# Patient Record
Sex: MALE | Race: WHITE | NOT HISPANIC OR LATINO | Employment: FULL TIME | URBAN - METROPOLITAN AREA
[De-identification: names, ages, dates, MRNs, and addresses within clinical notes are randomized per-mention and may not be internally consistent; named-entity substitution may affect disease eponyms.]

---

## 2017-08-22 ENCOUNTER — HOSPITAL ENCOUNTER (EMERGENCY)
Facility: MEDICAL CENTER | Age: 22
End: 2017-08-22
Attending: EMERGENCY MEDICINE
Payer: COMMERCIAL

## 2017-08-22 VITALS
BODY MASS INDEX: 35.79 KG/M2 | HEIGHT: 70 IN | DIASTOLIC BLOOD PRESSURE: 106 MMHG | HEART RATE: 74 BPM | OXYGEN SATURATION: 98 % | RESPIRATION RATE: 14 BRPM | TEMPERATURE: 97.5 F | WEIGHT: 250 LBS | SYSTOLIC BLOOD PRESSURE: 166 MMHG

## 2017-08-22 DIAGNOSIS — T50.901A DRUG OVERDOSE, ACCIDENTAL OR UNINTENTIONAL, INITIAL ENCOUNTER: ICD-10-CM

## 2017-08-22 LAB
ALBUMIN SERPL BCP-MCNC: 4.7 G/DL (ref 3.2–4.9)
ALBUMIN/GLOB SERPL: 1.6 G/DL
ALP SERPL-CCNC: 58 U/L (ref 30–99)
ALT SERPL-CCNC: 19 U/L (ref 2–50)
AMPHET UR QL SCN: NEGATIVE
ANION GAP SERPL CALC-SCNC: 20 MMOL/L (ref 0–11.9)
ANION GAP SERPL CALC-SCNC: 9 MMOL/L (ref 0–11.9)
AST SERPL-CCNC: 22 U/L (ref 12–45)
BARBITURATES UR QL SCN: NEGATIVE
BASOPHILS # BLD AUTO: 0.7 % (ref 0–1.8)
BASOPHILS # BLD: 0.12 K/UL (ref 0–0.12)
BENZODIAZ UR QL SCN: POSITIVE
BILIRUB SERPL-MCNC: 0.6 MG/DL (ref 0.1–1.5)
BUN SERPL-MCNC: 10 MG/DL (ref 8–22)
BUN SERPL-MCNC: 12 MG/DL (ref 8–22)
BZE UR QL SCN: NEGATIVE
CALCIUM SERPL-MCNC: 8.8 MG/DL (ref 8.5–10.5)
CALCIUM SERPL-MCNC: 9.9 MG/DL (ref 8.5–10.5)
CANNABINOIDS UR QL SCN: POSITIVE
CHLORIDE SERPL-SCNC: 102 MMOL/L (ref 96–112)
CHLORIDE SERPL-SCNC: 107 MMOL/L (ref 96–112)
CO2 SERPL-SCNC: 15 MMOL/L (ref 20–33)
CO2 SERPL-SCNC: 21 MMOL/L (ref 20–33)
CREAT SERPL-MCNC: 0.88 MG/DL (ref 0.5–1.4)
CREAT SERPL-MCNC: 1.26 MG/DL (ref 0.5–1.4)
EOSINOPHIL # BLD AUTO: 0.03 K/UL (ref 0–0.51)
EOSINOPHIL NFR BLD: 0.2 % (ref 0–6.9)
ERYTHROCYTE [DISTWIDTH] IN BLOOD BY AUTOMATED COUNT: 39.3 FL (ref 35.9–50)
ETHANOL BLD-MCNC: 0 G/DL
GFR SERPL CREATININE-BSD FRML MDRD: >60 ML/MIN/1.73 M 2
GFR SERPL CREATININE-BSD FRML MDRD: >60 ML/MIN/1.73 M 2
GLOBULIN SER CALC-MCNC: 2.9 G/DL (ref 1.9–3.5)
GLUCOSE BLD-MCNC: 163 MG/DL (ref 65–99)
GLUCOSE SERPL-MCNC: 216 MG/DL (ref 65–99)
GLUCOSE SERPL-MCNC: 93 MG/DL (ref 65–99)
HCT VFR BLD AUTO: 52.1 % (ref 42–52)
HGB BLD-MCNC: 17.8 G/DL (ref 14–18)
IMM GRANULOCYTES # BLD AUTO: 0.23 K/UL (ref 0–0.11)
IMM GRANULOCYTES NFR BLD AUTO: 1.3 % (ref 0–0.9)
LYMPHOCYTES # BLD AUTO: 2.13 K/UL (ref 1–4.8)
LYMPHOCYTES NFR BLD: 11.6 % (ref 22–41)
MCH RBC QN AUTO: 29.2 PG (ref 27–33)
MCHC RBC AUTO-ENTMCNC: 34.2 G/DL (ref 33.7–35.3)
MCV RBC AUTO: 85.4 FL (ref 81.4–97.8)
METHADONE UR QL SCN: NEGATIVE
MONOCYTES # BLD AUTO: 1.12 K/UL (ref 0–0.85)
MONOCYTES NFR BLD AUTO: 6.1 % (ref 0–13.4)
NEUTROPHILS # BLD AUTO: 14.76 K/UL (ref 1.82–7.42)
NEUTROPHILS NFR BLD: 80.1 % (ref 44–72)
NRBC # BLD AUTO: 0 K/UL
NRBC BLD AUTO-RTO: 0 /100 WBC
OPIATES UR QL SCN: NEGATIVE
OXYCODONE UR QL SCN: NEGATIVE
PCP UR QL SCN: NEGATIVE
PLATELET # BLD AUTO: 281 K/UL (ref 164–446)
PMV BLD AUTO: 11.2 FL (ref 9–12.9)
POC BREATHALIZER: 0 PERCENT (ref 0–0.01)
POTASSIUM SERPL-SCNC: 3.7 MMOL/L (ref 3.6–5.5)
POTASSIUM SERPL-SCNC: 4.6 MMOL/L (ref 3.6–5.5)
PROPOXYPH UR QL SCN: NEGATIVE
PROT SERPL-MCNC: 7.6 G/DL (ref 6–8.2)
RBC # BLD AUTO: 6.1 M/UL (ref 4.7–6.1)
SODIUM SERPL-SCNC: 137 MMOL/L (ref 135–145)
SODIUM SERPL-SCNC: 137 MMOL/L (ref 135–145)
WBC # BLD AUTO: 18.4 K/UL (ref 4.8–10.8)

## 2017-08-22 PROCEDURE — 80048 BASIC METABOLIC PNL TOTAL CA: CPT

## 2017-08-22 PROCEDURE — 85025 COMPLETE CBC W/AUTO DIFF WBC: CPT

## 2017-08-22 PROCEDURE — 96360 HYDRATION IV INFUSION INIT: CPT

## 2017-08-22 PROCEDURE — 82962 GLUCOSE BLOOD TEST: CPT

## 2017-08-22 PROCEDURE — 700105 HCHG RX REV CODE 258: Performed by: EMERGENCY MEDICINE

## 2017-08-22 PROCEDURE — 302970 POC BREATHALIZER: Performed by: EMERGENCY MEDICINE

## 2017-08-22 PROCEDURE — 80053 COMPREHEN METABOLIC PANEL: CPT

## 2017-08-22 PROCEDURE — 36415 COLL VENOUS BLD VENIPUNCTURE: CPT

## 2017-08-22 PROCEDURE — 80307 DRUG TEST PRSMV CHEM ANLYZR: CPT

## 2017-08-22 PROCEDURE — 99285 EMERGENCY DEPT VISIT HI MDM: CPT

## 2017-08-22 RX ORDER — SODIUM CHLORIDE 9 MG/ML
1000 INJECTION, SOLUTION INTRAVENOUS ONCE
Status: COMPLETED | OUTPATIENT
Start: 2017-08-22 | End: 2017-08-22

## 2017-08-22 RX ADMIN — SODIUM CHLORIDE 1000 ML: 9 INJECTION, SOLUTION INTRAVENOUS at 05:15

## 2017-08-22 RX ADMIN — SODIUM CHLORIDE 1000 ML: 9 INJECTION, SOLUTION INTRAVENOUS at 05:00

## 2017-08-22 ASSESSMENT — ENCOUNTER SYMPTOMS
ABDOMINAL PAIN: 0
HEADACHES: 0
FEVER: 0
SHORTNESS OF BREATH: 0

## 2017-08-22 ASSESSMENT — PAIN SCALES - GENERAL
PAINLEVEL_OUTOF10: 0
PAINLEVEL_OUTOF10: 0

## 2017-08-22 ASSESSMENT — LIFESTYLE VARIABLES: SUBSTANCE_ABUSE: 1

## 2017-08-22 NOTE — ED NOTES
"Pt brought in by EMS due to LSD and mushroom use. Pt was found outside stating he was having a bad trip. Pt yelling and shouting \"take me to [facility]\" and thinks [facility] is in the room. Pt hallucinating, agitated, uncooperative and diaphoretic. Pt placed in two violent restraints at 0411, right arm and left leg. Pt also placed in soft restraints at 0418, right arm and left leg. IV established, blood drawn and sent to lab. NS infusing, pt tachycardic 140s. Pupils PEERLA, 5mm. AAOxself  "

## 2017-08-22 NOTE — ED AVS SNAPSHOT
Home Care Instructions                                                                                                                Ranjan Ferris   MRN: [id number]    Department:  [facility], Emergency Dept   Date of Visit:  8/22/2017            [facility], Emergency Dept    [address] [place] [id number]    Phone:  [phone]      You were seen by     Gracie Sood D.O.      Your Diagnosis Was     Drug overdose, accidental or unintentional, initial encounter     T50.901A LSD. marijuana      These are the medications you received during your hospitalization from 08/22/2017 0418 to 08/22/2017 0855     Date/Time Order Dose Route Action    08/22/2017 0500 NS infusion 1,000 mL 1,000 mL Intravenous New Bag    08/22/2017 0515 NS infusion 1,000 mL 1,000 mL Intravenous New Bag      Follow-up Information     1. Follow up with Your Physician In 2 days.    Specialty:  Emergency Medicine    Contact information    Varies          2. Follow up with [facility], Emergency Dept.    Specialty:  Emergency Medicine    Why:  If symptoms worsen    Contact information    [address] [place] [id number] [phone]      Medication Information     Review all of your home medications and newly ordered medications with your primary doctor and/or pharmacist as soon as possible. Follow medication instructions as directed by your doctor and/or pharmacist.     Please keep your complete medication list with you and share with your physician. Update the information when medications are discontinued, doses are changed, or new medications (including over-the-counter products) are added; and carry medication information at all times in the event of emergency situations.               Medication List      Notice     You have not been prescribed any medications.            Procedures and tests performed during your visit     Procedure/Test Number of Times Performed       ACCU-CHECK 1    ACCU-CHEK GLUCOSE 1    BASIC METABOLIC PANEL 1    CARDIAC MONITORING 1    CBC WITH DIFFERENTIAL 1    COMP METABOLIC PANEL 1    DIAGNOSTIC ALCOHOL 1    ESTIMATED GFR 2    NURSING COMMUNICATION 1    O2 Protocol 1    POC BREATHALIZER 1    SALINE LOCK 1    URINE DRUG SCREEN (TRIAGE) 1        Discharge Instructions       Drug Overdose  Drug overdose happens when you take too much of a drug. An overdose can occur with illegal drugs, prescription drugs, or over-the-counter (OTC) drugs.  The effects of drug overdose can be mild, dangerous, or even deadly.  CAUSES  Drug overdose may be caused by:  · Taking too much of a drug on purpose.  · Taking too much of a drug by accident.  · An error made by a health care provider who prescribes a drug.  · An error made by a pharmacist who fills the prescription order.  Drugs that commonly cause overdose include:  · Mental health drugs.  · Pain medicines.  · Illegal drugs.  · OTC cough and cold medicines.  · Heart medicines.  · Seizure medicines.  RISK FACTORS  Drug overdose is more likely in:  · Children. They may be attracted to colorful pills. Because of children's small size, even a small amount of a drug can be dangerous.  · Elderly people. They may be taking many different drugs. Elderly people may have difficulty reading labels or remembering when they last took their medicine.  The risk of drug overdose is also higher for someone who:  · Takes illegal drugs.  · Takes a drug and drinks alcohol.  · Has a mental health condition.  SYMPTOMS  Signs and symptoms of drug overdose depend on the drug and the amount that was taken. Common danger signs include:  · Behavior changes.  · Sleepiness.  · Slowed breathing.  · Nausea and vomiting.  · Seizures.  · Changes in eye pupil size (very large or very small).  If there are signs of very low blood pressure from a drug overdose (shock), emergency treatment is required. These signs include:  · Cold and clammy skin.  · Pale  skin.  · Blue lips.  · Very slow breathing.  · Extreme sleepiness.  · Loss of consciousness.  DIAGNOSIS  Drug overdose may be diagnosed based on your symptoms. It is important that you tell your health care provider:  · All of the drugs that you have taken.  · When you took the drugs.  · Whether you were drinking alcohol.  Your health care provider will do a physical exam. This exam may include:  · Checking and monitoring your heart rate and rhythm, your temperature, and your blood pressure (vital signs).  · Checking your breathing and oxygen level.  You may also have tests, including:   · Urine tests to check for drugs in your system.  · Blood tests to check for:  ¨ Drugs in your system.  ¨ Signs of an imbalance of your blood minerals (electrolytes).  ¨ Liver damage.  ¨ Kidney damage.  TREATMENT  Supporting your vital signs and your breathing is the first step in treating a drug overdose. Treatment may also include:  · Receiving fluids and electrolytes through an IV tube.  · Having a breathing tube (endotracheal tube) inserted in your airway to help you breathe.  · Having a tube passed through your nose and into your stomach (nasogastric tube) to wash out your stomach.  · Medicines. You may get medicines to:  ¨ Make you vomit.  ¨ Absorb any medicine that is left in your digestive system (activated charcoal).  ¨ Block or reverse the effect of the drug that caused the overdose.  · Having your blood filtered through an artificial kidney machine (hemodialysis). You may need this if your overdose is severe or if you have kidney failure.  · Having ongoing counseling and mental health support if you intentionally overdosed or used an illegal drug.  HOME CARE INSTRUCTIONS  · Take medicines only as directed by your health care provider. Always ask your health care provider to discuss the possible side effects of any new drug that you start taking.  · Keep a list of all of the drugs that you take, including over-the-counter  medicines. Bring this list with you to all of your medical visits.  · Read the drug inserts that come with your medicines.  · Do not use illegal drugs.  · Do not drink alcohol when taking drugs.  · Store all medicines in safety containers that are out of the reach of children.  · Keep the phone number of your local poison control center near your phone or on your cell phone.  · Get help if you are struggling with alcohol or drug use.  · Get help if you are struggling with depression or another mental health problem.  · Keep all follow-up visits as directed by your health care provider. This is important.  SEEK MEDICAL CARE IF:  · Your symptoms return.  · You develop any new signs or symptoms when you are taking medicines.  SEEK IMMEDIATE MEDICAL CARE IF:  · You think that you or someone else may have taken too much of a drug. The hotline of the [place] [facility] is (418) 217-5364.  · You or someone else is having symptoms of a drug overdose.  · You have serious thoughts about hurting yourself or others.  · You have chest pain.  · You have difficulty breathing.  · You have a loss of consciousness.  Drug overdose is an emergency. Do not wait to see if the symptoms will go away. Get medical help right away. Call your local emergency services (911 in the [place] Do not drive yourself to the hospital.     This information is not intended to replace advice given to you by your health care provider. Make sure you discuss any questions you have with your health care provider.     Document Released: 05/03/2016 Document Reviewed: 12/23/2015  [employer] Interactive Patient Education ©2016 [employer]            Patient Information     Patient Information    Following emergency treatment: all patient requiring follow-up care must return either to a private physician or a clinic if your condition worsens before you are able to obtain further medical attention, please return to the emergency room.     Billing  Information    At [facility], we work to make the billing process streamlined for our patients.  Our Representatives are here to answer any questions you may have regarding your hospital bill.  If you have insurance coverage and have supplied your insurance information to us, we will submit a claim to your insurer on your behalf.  Should you have any questions regarding your bill, we can be reached online or by phone as follows:  Online: You are able pay your bills online or live chat with our representatives about any billing questions you may have. We are here to help Monday - Friday from 8:00am to 7:30pm and 9:00am - 12:00pm on Saturdays.  Please visit [email or web address]  for more information.   Phone:  [phone] or [phone]    Please note that your emergency physician, surgeon, pathologist, radiologist, anesthesiologist, and other specialists are not employed by [facility] and will therefore bill separately for their services.  Please contact them directly for any questions concerning their bills at the numbers below:     Emergency Physician Services:  [phone]  [facility]:  [phone]  [facility]:  [phone]  [facility]:  [phone]    1. Your final bill may vary from the amount quoted upon discharge if all procedures are not complete at that time, or if your doctor has additional procedures of which we are not aware. You will receive an additional bill if you return to the Emergency Department at [facility] for suture removal regardless of the facility of which the sutures were placed.     2. Please arrange for settlement of this account at the emergency registration.    3. All self-pay accounts are due in full at the time of treatment.  If you are unable to meet this obligation then payment is expected within 4-5 days.     4. If you have had radiology studies (CT, X-ray, Ultrasound, MRI), you have  received a preliminary result during your emergency department visit. Please contact the radiology department [phone] to receive a copy of your final result. Please discuss the Final result with your primary physician or with the follow up physician provided.     Crisis Hotline:  [place] Crisis Hotline:  9-053-QWVEJEL or 1-112.183.6006  [place] Crisis Hotline:    1-998.553.6018 or 154-148-7927         ED Discharge Follow Up Questions    1. In order to provide you with very good care, we would like to follow up with a phone call in the next few days.  May we have your permission to contact you?     YES /  NO    2. What is the best phone number to call you? (       )_____-__________    3. What is the best time to call you?      Morning  /  Afternoon  /  Evening                   Patient Signature:  ____________________________________________________________    Date:  ____________________________________________________________

## 2017-08-22 NOTE — DISCHARGE INSTRUCTIONS
Drug Overdose  Drug overdose happens when you take too much of a drug. An overdose can occur with illegal drugs, prescription drugs, or over-the-counter (OTC) drugs.  The effects of drug overdose can be mild, dangerous, or even deadly.  CAUSES  Drug overdose may be caused by:  · Taking too much of a drug on purpose.  · Taking too much of a drug by accident.  · An error made by a health care provider who prescribes a drug.  · An error made by a pharmacist who fills the prescription order.  Drugs that commonly cause overdose include:  · Mental health drugs.  · Pain medicines.  · Illegal drugs.  · OTC cough and cold medicines.  · Heart medicines.  · Seizure medicines.  RISK FACTORS  Drug overdose is more likely in:  · Children. They may be attracted to colorful pills. Because of children's small size, even a small amount of a drug can be dangerous.  · Elderly people. They may be taking many different drugs. Elderly people may have difficulty reading labels or remembering when they last took their medicine.  The risk of drug overdose is also higher for someone who:  · Takes illegal drugs.  · Takes a drug and drinks alcohol.  · Has a mental health condition.  SYMPTOMS  Signs and symptoms of drug overdose depend on the drug and the amount that was taken. Common danger signs include:  · Behavior changes.  · Sleepiness.  · Slowed breathing.  · Nausea and vomiting.  · Seizures.  · Changes in eye pupil size (very large or very small).  If there are signs of very low blood pressure from a drug overdose (shock), emergency treatment is required. These signs include:  · Cold and clammy skin.  · Pale skin.  · Blue lips.  · Very slow breathing.  · Extreme sleepiness.  · Loss of consciousness.  DIAGNOSIS  Drug overdose may be diagnosed based on your symptoms. It is important that you tell your health care provider:  · All of the drugs that you have taken.  · When you took the drugs.  · Whether you were drinking alcohol.  Your health  care provider will do a physical exam. This exam may include:  · Checking and monitoring your heart rate and rhythm, your temperature, and your blood pressure (vital signs).  · Checking your breathing and oxygen level.  You may also have tests, including:   · Urine tests to check for drugs in your system.  · Blood tests to check for:  ¨ Drugs in your system.  ¨ Signs of an imbalance of your blood minerals (electrolytes).  ¨ Liver damage.  ¨ Kidney damage.  TREATMENT  Supporting your vital signs and your breathing is the first step in treating a drug overdose. Treatment may also include:  · Receiving fluids and electrolytes through an IV tube.  · Having a breathing tube (endotracheal tube) inserted in your airway to help you breathe.  · Having a tube passed through your nose and into your stomach (nasogastric tube) to wash out your stomach.  · Medicines. You may get medicines to:  ¨ Make you vomit.  ¨ Absorb any medicine that is left in your digestive system (activated charcoal).  ¨ Block or reverse the effect of the drug that caused the overdose.  · Having your blood filtered through an artificial kidney machine (hemodialysis). You may need this if your overdose is severe or if you have kidney failure.  · Having ongoing counseling and mental health support if you intentionally overdosed or used an illegal drug.  HOME CARE INSTRUCTIONS  · Take medicines only as directed by your health care provider. Always ask your health care provider to discuss the possible side effects of any new drug that you start taking.  · Keep a list of all of the drugs that you take, including over-the-counter medicines. Bring this list with you to all of your medical visits.  · Read the drug inserts that come with your medicines.  · Do not use illegal drugs.  · Do not drink alcohol when taking drugs.  · Store all medicines in safety containers that are out of the reach of children.  · Keep the phone number of your local poison control  center near your phone or on your cell phone.  · Get help if you are struggling with alcohol or drug use.  · Get help if you are struggling with depression or another mental health problem.  · Keep all follow-up visits as directed by your health care provider. This is important.  SEEK MEDICAL CARE IF:  · Your symptoms return.  · You develop any new signs or symptoms when you are taking medicines.  SEEK IMMEDIATE MEDICAL CARE IF:  · You think that you or someone else may have taken too much of a drug. The hotline of the [facility] Control [facility] is (087) 432-0510.  · You or someone else is having symptoms of a drug overdose.  · You have serious thoughts about hurting yourself or others.  · You have chest pain.  · You have difficulty breathing.  · You have a loss of consciousness.  Drug overdose is an emergency. Do not wait to see if the symptoms will go away. Get medical help right away. Call your local emergency services (911 in the [place] Do not drive yourself to the hospital.     This information is not intended to replace advice given to you by your health care provider. Make sure you discuss any questions you have with your health care provider.     Document Released: 05/03/2016 Document Reviewed: 12/23/2015  [employer] Interactive Patient Education ©2016 [employer]
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
## 2017-08-22 NOTE — ED AVS SNAPSHOT
8/22/2017    Ranjan Ferris  [address]  [place] [address]    Dear Ranjan:    [facility] wants to ensure your discharge home is safe and you or your loved ones have had all of your questions answered regarding your care after you leave the hospital.    Below is a list of resources and contact information should you have any questions regarding your hospital stay, follow-up instructions, or active medical symptoms.    Questions or Concerns Regarding… Contact   Medical Questions Related to Your Discharge  (7 days a week, 8am-5pm) Contact a Nurse Care Coordinator   [phone]   Medical Questions Not Related to Your Discharge  (24 hours a day / 7 days a week)  Contact the Nurse Health Line   [phone]    Medications or Discharge Instructions Refer to your discharge packet   or contact your [facility] Primary Care Provider   [phone]   Follow-up Appointment(s) Schedule your appointment via [employer]   or contact Scheduling [phone]   Billing Review your statement via [employer]  or contact Billing [phone]   Medical Records Review your records via [employer]   or contact Medical Records [phone]     You may receive a telephone call within two days of discharge. This call is to make certain you understand your discharge instructions and have the opportunity to have any questions answered. You can also easily access your medical information, test results and upcoming appointments via the [employer]okonnect free online health management tool. You can learn more and sign up at [employer]/Cokonnect. For assistance setting up your Cokonnect account, please call [phone].    Once again, we want to ensure your discharge home is safe and that you have a clear understanding of any next steps in your care. If you have any questions or concerns, please do not hesitate to contact us, we are here for you. Thank you for choosing [facility] for your healthcare needs.    Sincerely,    Your [facility] Healthcare Team

## 2017-08-22 NOTE — ED AVS SNAPSHOT
Squarespace Access Code: [email or web address]  Expires: 9/21/2017  8:55 AM    Your email address is not on file at [facility].  Email Addresses are required for you to sign up for Squarespace, please contact [phone] to verify your personal information and to provide your email address prior to attempting to register for Squarespace.    Ranjan Chavira Josy  [address] [place] [address]    Squarespace  A secure, online tool to manage your health information     [employer]’s Squarespace® is a secure, online tool that connects you to your personalized health information from the privacy of your home -- day or night - making it very easy for you to manage your healthcare. Once the activation process is completed, you can even access your medical information using the Squarespace zhou, which is available for free in the Apple Zhou store or Google Play store.     To learn more about Squarespace, visit [email or web address]    There are two levels of access available (as shown below):   My Chart Features  [facility] Primary Care Doctor [facility]  Specialists [facility]  Urgent  Care Non-[facility] Primary Care Doctor   Email your healthcare team securely and privately 24/7 X X X    Manage appointments: schedule your next appointment; view details of past/upcoming appointments X      Request prescription refills. X      View recent personal medical records, including lab and immunizations X X X X   View health record, including health history, allergies, medications X X X X   Read reports about your outpatient visits, procedures, consult and ER notes X X X X   See your discharge summary, which is a recap of your hospital and/or ER visit that includes your diagnosis, lab results, and care plan X X  X     How to register for Squarespace:  Once your e-mail address has been verified, follow the following steps to sign up for Squarespace.     1. Go to  [email or web address]  2. Click on the Sign Up Now box, which takes you to the New Member Sign Up page. You will  need to provide the following information:  a. Enter your [employer] Access Code exactly as it appears at the top of this page. (You will not need to use this code after you’ve completed the sign-up process. If you do not sign up before the expiration date, you must request a new code.)   b. Enter your date of birth.   c. Enter your home email address.   d. Click Submit, and follow the next screen’s instructions.  3. Create a [email or web address] ID. This will be your [employer] login ID and cannot be changed, so think of one that is secure and easy to remember.  4. Create a [employer] password. You can change your password at any time.  5. Enter your Password Reset Question and Answer. This can be used at a later time if you forget your password.   6. Enter your e-mail address. This allows you to receive e-mail notifications when new information is available in [employer].  7. Click Sign Up. You can now view your health information.    For assistance activating your [employer] account, call [phone]

## 2018-02-03 ENCOUNTER — HOSPITAL ENCOUNTER (EMERGENCY)
Facility: MEDICAL CENTER | Age: 23
End: 2018-02-03
Attending: EMERGENCY MEDICINE
Payer: OTHER MISCELLANEOUS

## 2018-02-03 ENCOUNTER — APPOINTMENT (OUTPATIENT)
Dept: RADIOLOGY | Facility: MEDICAL CENTER | Age: 23
End: 2018-02-03
Attending: EMERGENCY MEDICINE
Payer: OTHER MISCELLANEOUS

## 2018-02-03 VITALS
HEART RATE: 109 BPM | TEMPERATURE: 98.3 F | DIASTOLIC BLOOD PRESSURE: 74 MMHG | RESPIRATION RATE: 16 BRPM | HEIGHT: 71 IN | BODY MASS INDEX: 33.6 KG/M2 | WEIGHT: 240 LBS | SYSTOLIC BLOOD PRESSURE: 124 MMHG | OXYGEN SATURATION: 96 %

## 2018-02-03 DIAGNOSIS — Z04.1 EXAM FOLLOWING MVC (MOTOR VEHICLE COLLISION), NO APPARENT INJURY: ICD-10-CM

## 2018-02-03 PROCEDURE — 99284 EMERGENCY DEPT VISIT MOD MDM: CPT

## 2018-02-03 PROCEDURE — 76705 ECHO EXAM OF ABDOMEN: CPT

## 2018-02-03 PROCEDURE — 305948 HCHG GREEN TRAUMA ACT PRE-NOTIFY NO CC

## 2018-02-03 NOTE — ED NOTES
Pt to be discharged and released to [facility].  [facility] signed discharge instructions and copy given to [facility].  Pt ambulated out with steady gait.
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
